# Patient Record
Sex: FEMALE | Race: WHITE | URBAN - METROPOLITAN AREA
[De-identification: names, ages, dates, MRNs, and addresses within clinical notes are randomized per-mention and may not be internally consistent; named-entity substitution may affect disease eponyms.]

---

## 2018-10-06 PROBLEM — F90.9 ATTENTION DEFICIT HYPERACTIVITY DISORDER (ADHD): Status: ACTIVE | Noted: 2018-10-06

## 2019-03-12 ENCOUNTER — TELEPHONE (OUTPATIENT)
Dept: FAMILY MEDICINE CLINIC | Facility: CLINIC | Age: 24
End: 2019-03-12

## 2019-03-12 NOTE — TELEPHONE ENCOUNTER
I spoke to Mom and she said teja most likely is up to date because of school. I gave her the immunizations that we have on file.

## 2019-03-29 ENCOUNTER — OFFICE VISIT (OUTPATIENT)
Dept: FAMILY MEDICINE CLINIC | Facility: CLINIC | Age: 24
End: 2019-03-29

## 2019-03-29 VITALS
HEART RATE: 104 BPM | RESPIRATION RATE: 16 BRPM | SYSTOLIC BLOOD PRESSURE: 108 MMHG | OXYGEN SATURATION: 98 % | HEIGHT: 63.75 IN | BODY MASS INDEX: 24.58 KG/M2 | TEMPERATURE: 98 F | WEIGHT: 142.19 LBS | DIASTOLIC BLOOD PRESSURE: 76 MMHG

## 2019-03-29 DIAGNOSIS — J01.40 ACUTE PANSINUSITIS, RECURRENCE NOT SPECIFIED: Primary | ICD-10-CM

## 2019-03-29 PROCEDURE — 99213 OFFICE O/P EST LOW 20 MIN: CPT | Performed by: PHYSICIAN ASSISTANT

## 2019-03-29 RX ORDER — AMOXICILLIN AND CLAVULANATE POTASSIUM 875; 125 MG/1; MG/1
1 TABLET, FILM COATED ORAL 2 TIMES DAILY
Qty: 20 TABLET | Refills: 0 | Status: SHIPPED | OUTPATIENT
Start: 2019-03-29 | End: 2019-04-08

## 2019-03-29 RX ORDER — FLUTICASONE PROPIONATE 50 MCG
2 SPRAY, SUSPENSION (ML) NASAL DAILY
Qty: 1 BOTTLE | Refills: 0 | Status: SHIPPED | OUTPATIENT
Start: 2019-03-29 | End: 2019-04-12

## 2019-03-29 NOTE — PATIENT INSTRUCTIONS
1. Augmentin  2. Flonase  3. Follow up with PCP    Sinusitis (Antibiotic Treatment)    The sinuses are air-filled spaces within the bones of the face.  They connect to the inside of the nose. Sinusitis is an inflammation of the tissue that lines the sinus · Over-the-counter antihistamines may help if allergies contributed to your sinusitis.    · Do not use nasal rinses or irrigation during an acute sinus infection, unless your healthcare provider tells you to.  Rinsing may spread the infection to other areas

## 2019-03-29 NOTE — PROGRESS NOTES
CHIEF COMPLAINT:   Patient presents with: Other: forehead and face pressure x 1 month      HPI:   Zari is a 24year old female who presents for cold symptoms for  4  weeks.  Symptoms have progressed into sinus congestion and have been worse /76 (BP Location: Right arm, Patient Position: Sitting, Cuff Size: adult)   Pulse 104   Temp 97.6 °F (36.4 °C) (Oral)   Resp 16   Ht 63.75\"   Wt 142 lb 3.2 oz   LMP 03/16/2019   SpO2 98%   BMI 24.60 kg/m²   GENERAL: well developed, well nourished,in Sinusitis (Antibiotic Treatment)    The sinuses are air-filled spaces within the bones of the face. They connect to the inside of the nose. Sinusitis is an inflammation of the tissue that lines the sinuses. Sinusitis can occur during a cold.  It can also ha · Do not use nasal rinses or irrigation during an acute sinus infection, unless your healthcare provider tells you to. Rinsing may spread the infection to other areas in your sinuses.   · Use acetaminophen or ibuprofen to control pain, unless another pain m

## 2020-05-12 ENCOUNTER — TELEPHONE (OUTPATIENT)
Dept: FAMILY MEDICINE CLINIC | Facility: CLINIC | Age: 25
End: 2020-05-12

## 2020-05-12 ENCOUNTER — VIRTUAL PHONE E/M (OUTPATIENT)
Dept: FAMILY MEDICINE CLINIC | Facility: CLINIC | Age: 25
End: 2020-05-12

## 2020-05-12 DIAGNOSIS — B86 SCABIES: Primary | ICD-10-CM

## 2020-05-12 PROCEDURE — 99213 OFFICE O/P EST LOW 20 MIN: CPT | Performed by: FAMILY MEDICINE

## 2020-05-12 RX ORDER — IVERMECTIN 3 MG/1
1 TABLET ORAL ONCE
Qty: 1 TABLET | Refills: 0 | Status: SHIPPED | OUTPATIENT
Start: 2020-05-12 | End: 2020-05-21

## 2020-05-12 NOTE — PROGRESS NOTES
Virtual Telephone Check-In    [facility] NENITA Moran verbally consents to a Virtual/Telephone Check-In visit on 05/12/20.     Patient understands and accepts financial responsibility for any deductible, co-insurance and/or co-pays associated with this service

## 2020-05-12 NOTE — PATIENT INSTRUCTIONS
Thank you for choosing Niles Jimenez MD at [place] [address]  To Do: [facility] NENITA Moran  1. Please take meds as directed. [facility] is located in [address]. Monday, Tuesday & Friday – 8 a.m. to 4 p.m.   Wednesday, Thursday – 7 a.m. to outweigh those potential risks and we strive to make you healthier and to improve your quality of life.     Referrals, and Radiology Information:    If your insurance requires a referral to a specialist, please allow 5 business days to process your referral by an infected person. It may take 4 to 6 weeks for symptoms to appear after being exposed. Everyone living in the house with you, as well as your sexual partners, should be treated at the same time.  After the first treatment, you will no longer be contag your nipples before feeding. Then reapply the cream after breastfeeding. · For babies or infants, put mittens on their hands. This will stop them from licking the cream or lotion. It will also stop them from scratching themselves because of the itching.

## 2020-05-12 NOTE — TELEPHONE ENCOUNTER
Future Appointments   Date Time Provider [place] Luisa   5/12/2020  3:30 PM Emanuel Oconnell MD [facility] [address] [facility] [address]   5/15/2020 10:15 AM Juany Arenas MD [facility]     Patient verbally consents to a virtual/telephone check-in service for

## 2020-05-12 NOTE — PROGRESS NOTES
HPI:    Patient ID: Thee Huggins is a 21year old female. HPI  Ms. Majo Dai is a pleasant 39-year-old female who has been generally healthy presenting for phone visit for generalized rash which started a few days ago.   Prior to the rash appeari Prescriptions Disp Refills   • Ivermectin 3 MG Oral Tab 1 tablet 0     Sig: Take 1 tablet (3 mg total) by mouth one time for 1 dose.        Imaging & Referrals:  None       [id number]

## 2020-05-21 RX ORDER — IVERMECTIN 3 MG/1
1 TABLET ORAL ONCE
Qty: 1 TABLET | Refills: 0 | Status: SHIPPED | OUTPATIENT
Start: 2020-05-21 | End: 2020-05-21

## 2020-05-21 NOTE — TELEPHONE ENCOUNTER
See phone message below:    Last visit 5/12/2020 - Telephone  ASSESSMENT/PLAN:   Scabies  (primary encounter diagnosis)  -Will prescribe with ivermectin 3 mg once; practice good hygiene at this point and agree with laundering her bedding's and her clothes.

## 2020-05-21 NOTE — TELEPHONE ENCOUNTER
- Pt still has rash and would like a refill for IVermectin 3mg. Pt would also like the cream as discussed to apply to rash area. Pt also weighed herself and is 150 pounds.     [address] [facility], [address] [facility] [place] [phone], [phone]

## 2021-07-06 PROCEDURE — 93325 DOPPLER ECHO COLOR FLOW MAPG: CPT | Performed by: PEDIATRICS

## 2021-07-06 PROCEDURE — 76827 ECHO EXAM OF FETAL HEART: CPT | Performed by: PEDIATRICS

## 2021-07-06 PROCEDURE — 76825 ECHO EXAM OF FETAL HEART: CPT | Performed by: PEDIATRICS
